# Patient Record
Sex: MALE | Race: ASIAN | NOT HISPANIC OR LATINO | URBAN - METROPOLITAN AREA
[De-identification: names, ages, dates, MRNs, and addresses within clinical notes are randomized per-mention and may not be internally consistent; named-entity substitution may affect disease eponyms.]

---

## 2019-11-27 VITALS
WEIGHT: 162.26 LBS | HEIGHT: 63 IN | TEMPERATURE: 98 F | SYSTOLIC BLOOD PRESSURE: 116 MMHG | DIASTOLIC BLOOD PRESSURE: 75 MMHG | OXYGEN SATURATION: 98 % | HEART RATE: 66 BPM | RESPIRATION RATE: 18 BRPM

## 2019-11-27 RX ORDER — CHLORHEXIDINE GLUCONATE 213 G/1000ML
1 SOLUTION TOPICAL ONCE
Refills: 0 | Status: DISCONTINUED | OUTPATIENT
Start: 2019-12-03 | End: 2019-12-03

## 2019-11-27 NOTE — H&P ADULT - NSICDXPASTMEDICALHX_GEN_ALL_CORE_FT
PAST MEDICAL HISTORY:  Coronary artery disease     Hyperlipidemia     Hypertension     Type II diabetes mellitus

## 2019-11-27 NOTE — H&P ADULT - NSHPLABSRESULTS_GEN_ALL_CORE
EKG: EKG: SR @ 67bpm, T wave flattening in aVL, minimal STD in V3-V6                          11.2   6.30  )-----------( 300      ( 03 Dec 2019 06:59 )             36.3   PT/INR - ( 03 Dec 2019 06:59 )   PT: 11.6 sec;   INR: 1.03          PTT - ( 03 Dec 2019 06:59 )  PTT:33.4 sec    12-03    142  |  106  |  12  ----------------------------<  117<H>  4.4   |  24  |  0.80    Ca    9.3      03 Dec 2019 06:59    TPro  7.6  /  Alb  4.4  /  TBili  0.5  /  DBili  x   /  AST  23  /  ALT  29  /  AlkPhos  95  12-03

## 2019-11-27 NOTE — H&P ADULT - RS GEN PE MLT RESP DETAILS PC
no rhonchi/no chest wall tenderness/no rales/no wheezes/respirations non-labored/no intercostal retractions/clear to auscultation bilaterally

## 2019-11-27 NOTE — H&P ADULT - HISTORY OF PRESENT ILLNESS
confirm Medications    History obtained from   63 y/o alecia speaking male with PMHx of HTN, hyperlipidemia, DM II, Hypothyroidism s/p thyroidectomy, CAD (NSTEMI 2016 at [facility])  presented to his cardiologist for cp described as "heaviness" for the last 2-3 months. The patient has cp with walking 1-2 city blocks, the patient admits to waking up at night with chest pain and when eating, it is associated with shoulder pain.  Denies palpitations, syncope, orthopnea, PND, N/V, and LE edema.    WAITING FOR CATH REPORT FROM [facility]     exercise stress test was done on 10/28/2019 showing small reversible myocardial perfusion defect of the septal wall with mild ischemia.   In light of patients    Stress EKG: horizontal depression confirm Medications    History obtained from   65 y/o alecia speaking male with PMHx of HTN, hyperlipidemia, DM II, Hypothyroidism s/p thyroidectomy, CAD (NSTEMI 2016 at [facility])  presented to his cardiologist for cp described as "heaviness" for the last 2-3 months. The patient has cp with walking 1-2 city blocks, the patient admits to waking up at night with chest pain and when eating, it is associated with shoulder pain.  Denies palpitations, syncope, orthopnea, PND, N/V, and LE edema.    WAITING FOR CATH REPORT FROM [facility]- check tracker    Exercise stress test was done on 10/28/2019 was consistent with myocardial ischemia  NST on 10/28/19 showing small reversible myocardial perfusion defect of the septal wall with mild ischemia.     In light of patients risk factors, CCS class III anginal symptoms, abnormal NST and known history of CAD the patient presents today for recommended cardiac cath with possible intervention. confirm Medications    History obtained from daughter Natalio Washington  63 y/o alecia speaking male with PMHx of HTN, hyperlipidemia, DM II, Hypothyroidism s/p thyroidectomy, CAD (NSTEMI 2016 at [facility])  presented to his cardiologist for cp described as "heaviness" for the last 2-3 months. The patient has cp with walking 1-2 city blocks, the patient admits to waking up at night with chest pain and when eating, it is associated with shoulder pain.  Denies palpitations, syncope, orthopnea, PND, N/V, and LE edema.    WAITING FOR CATH REPORT FROM [facility]- check tracker    Exercise stress test was done on 10/28/2019 was consistent with myocardial ischemia  NST on 10/28/19 showing small reversible myocardial perfusion defect of the septal wall with mild ischemia.     In light of patients risk factors, CCS class III anginal symptoms, abnormal NST and known history of CAD the patient presents today for recommended cardiac cath with possible intervention. confirm Medications    History obtained from daughter Natalio Washington  63 y/o alecia speaking male with PMHx of HTN, hyperlipidemia, DM II, Hypothyroidism s/p thyroidectomy, CAD (NSTEMI 2016 at [facility] DEX to proxLCx and balloon angioplasty to OM1)  presented to his cardiologist for cp described as "heaviness" for the last 2-3 months. The patient has cp with walking 1-2 city blocks, the patient admits to waking up at night with chest pain and when eating, it is associated with shoulder pain.  Denies palpitations, syncope, orthopnea, PND, N/V, and LE edema.    Cardiac Cath at [facility] on 11/1/16 DEX to proxLCx, balloon angioplasty to OM1.   LM: normal  LAD: mild atherosclerosis  LCx: prox 90% stenosis at origin of OM1  OM1: 50% stenosis  RCA: minor luminal irregularities     Exercise stress test was done on 10/28/2019 was consistent with myocardial ischemia  NST on 10/28/19 showing small reversible myocardial perfusion defect of the septal wall with mild ischemia.     In light of patients risk factors, CCS class III anginal symptoms, abnormal NST and known history of CAD the patient presents today for recommended cardiac cath with possible intervention. History obtained from daughter Natalio Washington  65 y/o Hitesh speaking male with PMHx of HTN, hyperlipidemia, DM II, Hypothyroidism s/p thyroidectomy, CAD (NSTEMI 2016 at [facility] DEX to proxLCx and balloon angioplasty to OM1)  presented to his cardiologist for cp described as "heaviness" for the last 2-3 months. The patient has cp with walking 1-2 city blocks, the patient admits to waking up at night with chest pain and when eating, it is associated with shoulder pain.  Denies palpitations, syncope, orthopnea, PND, N/V, and LE edema.    Cardiac Cath at [facility] on 11/1/16 DEX to proxLCx, balloon angioplasty to OM1.   LM: normal  LAD: mild atherosclerosis  LCx: prox 90% stenosis at origin of OM1  OM1: 50% stenosis  RCA: minor luminal irregularities     Exercise stress test was done on 10/28/2019 was consistent with myocardial ischemia  NST on 10/28/19 showing small reversible myocardial perfusion defect of the septal wall with mild ischemia.     In light of patients risk factors, CCS class III anginal symptoms, abnormal NST and known history of CAD the patient presents today for recommended cardiac cath with possible intervention. History obtained from daughter Natalio Washington  65 y/o Hitesh speaking male with PMHx of HTN, hyperlipidemia, DM II, Hypothyroidism s/p thyroidectomy, CAD (NSTEMI 2016 at [facility] DEX to proxLCx and balloon angioplasty to OM1)  presented to his cardiologist for cp described as "heaviness" for the last 2-3 months. The patient has cp with walking 1-2 city blocks, the patient admits to waking up at night with chest pain and when eating, it is associated with shoulder pain.  Denies palpitations, syncope, orthopnea, PND, N/V, and LE edema.    Cardiac Cath at [facility] on 11/1/16 DEX to proxLCx, balloon angioplasty to OM1.   LM: normal  LAD: mild atherosclerosis  LCx: prox 90% stenosis at origin of OM1  OM1: 50% stenosis  RCA: minor luminal irregularities, EF 50%     Exercise stress test was done on 10/28/2019 was consistent with myocardial ischemia  NST on 10/28/19 showing small reversible myocardial perfusion defect of the septal wall with mild ischemia.     In light of patients risk factors, CCS class III anginal symptoms, abnormal NST and known history of CAD the patient presents today for recommended cardiac cath with possible intervention.

## 2019-11-27 NOTE — H&P ADULT - ASSESSMENT
65 y/o Hitesh speaking male with PMHx of HTN, hyperlipidemia, DM II, Hypothyroidism s/p thyroidectomy, CAD (NSTEMI 2016 at [facility] DEX to proxLCx and balloon angioplasty to OM1) who in light of patients risk factors, CCS class III anginal symptoms, abnormal NST and known history of CAD, now presents to [facility] for recommended cardiac cath with possible intervention if clinically indicated.    ASA: III       Mallampati: III      IVF NS @    Precath consent obtained   Risks & benefits of procedure and alternative therapy have been explained to the patient including but not limited to: allergic reaction, bleeding w/possible need for blood transfusion, infection, renal and vascular compromise, limb damage, arrhythmia, stroke, vessel dissection/perforation, Myocardial infarction, emergent CABG. Informed consent obtained and in chart. 63 y/o Hitesh speaking male with PMHx of HTN, hyperlipidemia, DM II, Hypothyroidism s/p thyroidectomy, CAD (NSTEMI 2016 at [facility] DEX to proxLCx and balloon angioplasty to OM1) who in light of patients risk factors, CCS class III anginal symptoms, abnormal NST and known history of CAD, now presents to [facility] for recommended cardiac cath with possible intervention if clinically indicated.    ASA: III       Mallampati: III    Pt reports strict daily compliance with ASA/Plavix regimen. Pt did not take ASA/Plavix yet this AM. Pt given ASA 81mg PO x 1 and Plavix 600mg PO x 1 prior to cath today. H/H stable as reviewed with Dr. Peña.    IVF NS @ 75cc/hr (EF 50% by cath in 11/2016)    Precath consent obtained   Risks & benefits of procedure and alternative therapy have been explained to the patient including but not limited to: allergic reaction, bleeding w/possible need for blood transfusion, infection, renal and vascular compromise, limb damage, arrhythmia, stroke, vessel dissection/perforation, Myocardial infarction, emergent CABG. Informed consent obtained and in chart.

## 2019-12-03 ENCOUNTER — OUTPATIENT (OUTPATIENT)
Dept: OUTPATIENT SERVICES | Facility: HOSPITAL | Age: 64
LOS: 1 days | Discharge: NURSING, REHAB OR LONG-TERM CARE | End: 2019-12-03
Payer: MEDICAID

## 2019-12-03 DIAGNOSIS — Z98.890 OTHER SPECIFIED POSTPROCEDURAL STATES: Chronic | ICD-10-CM

## 2019-12-03 DIAGNOSIS — Z98.49 CATARACT EXTRACTION STATUS, UNSPECIFIED EYE: Chronic | ICD-10-CM

## 2019-12-03 LAB
ALBUMIN SERPL ELPH-MCNC: 4.4 G/DL — SIGNIFICANT CHANGE UP (ref 3.3–5)
ALP SERPL-CCNC: 95 U/L — SIGNIFICANT CHANGE UP (ref 40–120)
ALT FLD-CCNC: 29 U/L — SIGNIFICANT CHANGE UP (ref 10–45)
ANION GAP SERPL CALC-SCNC: 12 MMOL/L — SIGNIFICANT CHANGE UP (ref 5–17)
APTT BLD: 33.4 SEC — SIGNIFICANT CHANGE UP (ref 27.5–36.3)
AST SERPL-CCNC: 23 U/L — SIGNIFICANT CHANGE UP (ref 10–40)
BASOPHILS # BLD AUTO: 0.05 K/UL — SIGNIFICANT CHANGE UP (ref 0–0.2)
BASOPHILS NFR BLD AUTO: 0.8 % — SIGNIFICANT CHANGE UP (ref 0–2)
BILIRUB SERPL-MCNC: 0.5 MG/DL — SIGNIFICANT CHANGE UP (ref 0.2–1.2)
BUN SERPL-MCNC: 12 MG/DL — SIGNIFICANT CHANGE UP (ref 7–23)
CALCIUM SERPL-MCNC: 9.3 MG/DL — SIGNIFICANT CHANGE UP (ref 8.4–10.5)
CHLORIDE SERPL-SCNC: 106 MMOL/L — SIGNIFICANT CHANGE UP (ref 96–108)
CHOLEST SERPL-MCNC: 84 MG/DL — SIGNIFICANT CHANGE UP (ref 10–199)
CK MB CFR SERPL CALC: 1.8 NG/ML — SIGNIFICANT CHANGE UP (ref 0–6.7)
CK SERPL-CCNC: 155 U/L — SIGNIFICANT CHANGE UP (ref 30–200)
CO2 SERPL-SCNC: 24 MMOL/L — SIGNIFICANT CHANGE UP (ref 22–31)
CREAT SERPL-MCNC: 0.8 MG/DL — SIGNIFICANT CHANGE UP (ref 0.5–1.3)
CRP SERPL-MCNC: 0.36 MG/DL — SIGNIFICANT CHANGE UP (ref 0–0.4)
EOSINOPHIL # BLD AUTO: 0.44 K/UL — SIGNIFICANT CHANGE UP (ref 0–0.5)
EOSINOPHIL NFR BLD AUTO: 7 % — HIGH (ref 0–6)
GLUCOSE BLDC GLUCOMTR-MCNC: 104 MG/DL — HIGH (ref 70–99)
GLUCOSE SERPL-MCNC: 117 MG/DL — HIGH (ref 70–99)
HBA1C BLD-MCNC: 6.8 % — HIGH (ref 4–5.6)
HCT VFR BLD CALC: 36.3 % — LOW (ref 39–50)
HDLC SERPL-MCNC: 34 MG/DL — LOW
HGB BLD-MCNC: 11.2 G/DL — LOW (ref 13–17)
IMM GRANULOCYTES NFR BLD AUTO: 0.2 % — SIGNIFICANT CHANGE UP (ref 0–1.5)
INR BLD: 1.03 — SIGNIFICANT CHANGE UP (ref 0.88–1.16)
LIPID PNL WITH DIRECT LDL SERPL: 27 MG/DL — SIGNIFICANT CHANGE UP
LYMPHOCYTES # BLD AUTO: 2.25 K/UL — SIGNIFICANT CHANGE UP (ref 1–3.3)
LYMPHOCYTES # BLD AUTO: 35.7 % — SIGNIFICANT CHANGE UP (ref 13–44)
MCHC RBC-ENTMCNC: 28.1 PG — SIGNIFICANT CHANGE UP (ref 27–34)
MCHC RBC-ENTMCNC: 30.9 GM/DL — LOW (ref 32–36)
MCV RBC AUTO: 91 FL — SIGNIFICANT CHANGE UP (ref 80–100)
MONOCYTES # BLD AUTO: 0.89 K/UL — SIGNIFICANT CHANGE UP (ref 0–0.9)
MONOCYTES NFR BLD AUTO: 14.1 % — HIGH (ref 2–14)
NEUTROPHILS # BLD AUTO: 2.66 K/UL — SIGNIFICANT CHANGE UP (ref 1.8–7.4)
NEUTROPHILS NFR BLD AUTO: 42.2 % — LOW (ref 43–77)
NRBC # BLD: 0 /100 WBCS — SIGNIFICANT CHANGE UP (ref 0–0)
PLATELET # BLD AUTO: 300 K/UL — SIGNIFICANT CHANGE UP (ref 150–400)
POTASSIUM SERPL-MCNC: 4.4 MMOL/L — SIGNIFICANT CHANGE UP (ref 3.5–5.3)
POTASSIUM SERPL-SCNC: 4.4 MMOL/L — SIGNIFICANT CHANGE UP (ref 3.5–5.3)
PROT SERPL-MCNC: 7.6 G/DL — SIGNIFICANT CHANGE UP (ref 6–8.3)
PROTHROM AB SERPL-ACNC: 11.6 SEC — SIGNIFICANT CHANGE UP (ref 10–12.9)
RBC # BLD: 3.99 M/UL — LOW (ref 4.2–5.8)
RBC # FLD: 13.9 % — SIGNIFICANT CHANGE UP (ref 10.3–14.5)
SODIUM SERPL-SCNC: 142 MMOL/L — SIGNIFICANT CHANGE UP (ref 135–145)
TOTAL CHOLESTEROL/HDL RATIO MEASUREMENT: 2.5 RATIO — LOW (ref 3.4–9.6)
TRIGL SERPL-MCNC: 115 MG/DL — SIGNIFICANT CHANGE UP (ref 10–149)
WBC # BLD: 6.3 K/UL — SIGNIFICANT CHANGE UP (ref 3.8–10.5)
WBC # FLD AUTO: 6.3 K/UL — SIGNIFICANT CHANGE UP (ref 3.8–10.5)

## 2019-12-03 PROCEDURE — 85610 PROTHROMBIN TIME: CPT

## 2019-12-03 PROCEDURE — 85025 COMPLETE CBC W/AUTO DIFF WBC: CPT

## 2019-12-03 PROCEDURE — 93005 ELECTROCARDIOGRAM TRACING: CPT

## 2019-12-03 PROCEDURE — 86140 C-REACTIVE PROTEIN: CPT

## 2019-12-03 PROCEDURE — C1894: CPT

## 2019-12-03 PROCEDURE — 93010 ELECTROCARDIOGRAM REPORT: CPT

## 2019-12-03 PROCEDURE — 82550 ASSAY OF CK (CPK): CPT

## 2019-12-03 PROCEDURE — 82553 CREATINE MB FRACTION: CPT

## 2019-12-03 PROCEDURE — 93458 L HRT ARTERY/VENTRICLE ANGIO: CPT

## 2019-12-03 PROCEDURE — C1769: CPT

## 2019-12-03 PROCEDURE — 93458 L HRT ARTERY/VENTRICLE ANGIO: CPT | Mod: 26

## 2019-12-03 PROCEDURE — C1887: CPT

## 2019-12-03 PROCEDURE — 83036 HEMOGLOBIN GLYCOSYLATED A1C: CPT

## 2019-12-03 PROCEDURE — 82962 GLUCOSE BLOOD TEST: CPT

## 2019-12-03 PROCEDURE — 80053 COMPREHEN METABOLIC PANEL: CPT

## 2019-12-03 PROCEDURE — 80061 LIPID PANEL: CPT

## 2019-12-03 PROCEDURE — 85730 THROMBOPLASTIN TIME PARTIAL: CPT

## 2019-12-03 RX ORDER — METOPROLOL TARTRATE 50 MG
1 TABLET ORAL
Qty: 0 | Refills: 0 | DISCHARGE

## 2019-12-03 RX ORDER — CHOLECALCIFEROL (VITAMIN D3) 125 MCG
1 CAPSULE ORAL
Qty: 0 | Refills: 0 | DISCHARGE

## 2019-12-03 RX ORDER — ASCORBIC ACID 60 MG
1 TABLET,CHEWABLE ORAL
Qty: 0 | Refills: 0 | DISCHARGE

## 2019-12-03 RX ORDER — ALLOPURINOL 300 MG
1 TABLET ORAL
Qty: 0 | Refills: 0 | DISCHARGE

## 2019-12-03 RX ORDER — LEVOTHYROXINE SODIUM 125 MCG
1 TABLET ORAL
Qty: 0 | Refills: 0 | DISCHARGE

## 2019-12-03 RX ORDER — ASPIRIN/CALCIUM CARB/MAGNESIUM 324 MG
81 TABLET ORAL ONCE
Refills: 0 | Status: COMPLETED | OUTPATIENT
Start: 2019-12-03 | End: 2019-12-03

## 2019-12-03 RX ORDER — CLOPIDOGREL BISULFATE 75 MG/1
1 TABLET, FILM COATED ORAL
Qty: 0 | Refills: 0 | DISCHARGE

## 2019-12-03 RX ORDER — SIMETHICONE 80 MG/1
1 TABLET, CHEWABLE ORAL
Qty: 0 | Refills: 0 | DISCHARGE

## 2019-12-03 RX ORDER — ASPIRIN/CALCIUM CARB/MAGNESIUM 324 MG
1 TABLET ORAL
Qty: 0 | Refills: 0 | DISCHARGE

## 2019-12-03 RX ORDER — SODIUM CHLORIDE 9 MG/ML
500 INJECTION INTRAMUSCULAR; INTRAVENOUS; SUBCUTANEOUS
Refills: 0 | Status: DISCONTINUED | OUTPATIENT
Start: 2019-12-03 | End: 2019-12-03

## 2019-12-03 RX ORDER — FAMOTIDINE 10 MG/ML
1 INJECTION INTRAVENOUS
Qty: 0 | Refills: 0 | DISCHARGE

## 2019-12-03 RX ORDER — METFORMIN HYDROCHLORIDE 850 MG/1
1 TABLET ORAL
Qty: 0 | Refills: 0 | DISCHARGE

## 2019-12-03 RX ORDER — CLOPIDOGREL BISULFATE 75 MG/1
75 TABLET, FILM COATED ORAL ONCE
Refills: 0 | Status: COMPLETED | OUTPATIENT
Start: 2019-12-03 | End: 2019-12-03

## 2019-12-03 RX ORDER — MELOXICAM 15 MG/1
1 TABLET ORAL
Qty: 0 | Refills: 0 | DISCHARGE

## 2019-12-03 RX ORDER — LOSARTAN POTASSIUM 100 MG/1
1 TABLET, FILM COATED ORAL
Qty: 0 | Refills: 0 | DISCHARGE

## 2019-12-03 RX ADMIN — Medication 81 MILLIGRAM(S): at 07:54

## 2019-12-03 RX ADMIN — SODIUM CHLORIDE 75 MILLILITER(S): 9 INJECTION INTRAMUSCULAR; INTRAVENOUS; SUBCUTANEOUS at 07:55

## 2019-12-03 RX ADMIN — CLOPIDOGREL BISULFATE 75 MILLIGRAM(S): 75 TABLET, FILM COATED ORAL at 07:55

## 2019-12-03 RX ADMIN — SODIUM CHLORIDE 75 MILLILITER(S): 9 INJECTION INTRAMUSCULAR; INTRAVENOUS; SUBCUTANEOUS at 10:10

## 2019-12-03 NOTE — PROGRESS NOTE ADULT - SUBJECTIVE AND OBJECTIVE BOX
Interventional Cardiology PA SDA Discharge Note    Patient without complaints. Ambulated and voided without difficulties    Afebrile, VSS    Ext:    Right Radial :  no  hematoma,  no   bleeding, dressing; C/D/I      Pulses:    intact RAD to baseline     A/P:      65 y/o Hitesh speaking male with PMHx of HTN, hyperlipidemia, DM II, Hypothyroidism s/p thyroidectomy, CAD (NSTEMI 2016 at [facility] ABRIL to proxLCx and balloon angioplasty to OM1) who in light of patients risk factors, CCS class III anginal symptoms, abnormal NST and known history of CAD, now presents to [facility] for recommended cardiac cath. Pt is now s/p cardiac catheterization 12/3/2019: Patent stent in LCx. EF normal. Pt should continue with medical management.             1.	Stable for discharge as per attending Dr. Peña after bed rest, pt voids, wrist stable and 30 minutes of ambulation.  2.	Follow-up with PMD/Cardiologist Dr. Peña in 1-2 weeks  3.	Discharged forms signed and copies in chart Interventional Cardiology PA SDA Discharge Note    Patient without complaints. Ambulated and voided without difficulties    Afebrile, VSS    Ext:    Right Radial :  no  hematoma,  no   bleeding, dressing; C/D/I      Pulses:    intact RAD to baseline     A/P:      63 y/o Hitesh speaking male with PMHx of HTN, hyperlipidemia, DM II, Hypothyroidism s/p thyroidectomy, CAD (NSTEMI 2016 at [facility] ABRIL to proxLCx and balloon angioplasty to OM1) who in light of patients risk factors, CCS class III anginal symptoms, abnormal NST and known history of CAD, now presents to [facility] for recommended cardiac cath. Pt is now s/p cardiac catheterization 12/3/2019: Patent stent in LCx. EF normal. Pt should continue with medical management.             1.	Stable for discharge as per attending Dr. Peña after bed rest, pt voids, wrist stable and 30 minutes of ambulation.  2.          Discontinued Plavix per Dr. Peña. Informed pt to hold Metformin for 48h.   3.	Follow-up with PMD/Cardiologist Dr. Peña in 1-2 weeks  4.	Discharged forms signed and copies in chart Interventional Cardiology PA SDA Discharge Note    Patient without complaints. Ambulated and voided without difficulties    Afebrile, VSS    Ext:    Right Radial :  no  hematoma,  no   bleeding, dressing; C/D/I      Pulses:    intact RAD to baseline     A/P:      [employer]  utilized for patient visit. ID: [id number]    65 y/o Hitesh speaking male with PMHx of HTN, hyperlipidemia, DM II, Hypothyroidism s/p thyroidectomy, CAD (NSTEMI 2016 at [facility] ABRIL to proxLCx and balloon angioplasty to OM1) who in light of patients risk factors, CCS class III anginal symptoms, abnormal NST and known history of CAD, now presents to [facility] for recommended cardiac cath. Pt is now s/p cardiac catheterization 12/3/2019: Patent stent in LCx. EF normal. Pt should continue with medical management.             1.	Stable for discharge as per attending Dr. Peña after bed rest, pt voids, wrist stable and 30 minutes of ambulation.  2.          Discontinued Plavix per Dr. Peña. Informed pt to hold Metformin for 48h.   3.	Follow-up with PMD/Cardiologist Dr. Peña in 1-2 weeks  4.	Discharged forms signed and copies in chart

## 2021-05-14 ENCOUNTER — EMERGENCY (EMERGENCY)
Facility: HOSPITAL | Age: 66
LOS: 1 days | Discharge: HOME | End: 2021-05-14
Attending: EMERGENCY MEDICINE | Admitting: EMERGENCY MEDICINE
Payer: MEDICARE

## 2021-05-14 VITALS
HEIGHT: 63 IN | SYSTOLIC BLOOD PRESSURE: 111 MMHG | DIASTOLIC BLOOD PRESSURE: 77 MMHG | RESPIRATION RATE: 16 BRPM | TEMPERATURE: 98 F | OXYGEN SATURATION: 99 % | HEART RATE: 69 BPM

## 2021-05-14 VITALS
DIASTOLIC BLOOD PRESSURE: 67 MMHG | HEART RATE: 54 BPM | SYSTOLIC BLOOD PRESSURE: 113 MMHG | RESPIRATION RATE: 16 BRPM | OXYGEN SATURATION: 99 %

## 2021-05-14 DIAGNOSIS — Z98.49 CATARACT EXTRACTION STATUS, UNSPECIFIED EYE: Chronic | ICD-10-CM

## 2021-05-14 DIAGNOSIS — Z98.890 OTHER SPECIFIED POSTPROCEDURAL STATES: Chronic | ICD-10-CM

## 2021-05-14 PROBLEM — E78.5 HYPERLIPIDEMIA, UNSPECIFIED: Chronic | Status: ACTIVE | Noted: 2019-11-27

## 2021-05-14 PROBLEM — E11.9 TYPE 2 DIABETES MELLITUS WITHOUT COMPLICATIONS: Chronic | Status: ACTIVE | Noted: 2019-11-27

## 2021-05-14 PROBLEM — I10 ESSENTIAL (PRIMARY) HYPERTENSION: Chronic | Status: ACTIVE | Noted: 2019-11-27

## 2021-05-14 PROBLEM — I25.10 ATHEROSCLEROTIC HEART DISEASE OF NATIVE CORONARY ARTERY WITHOUT ANGINA PECTORIS: Chronic | Status: ACTIVE | Noted: 2019-11-27

## 2021-05-14 LAB
ALBUMIN SERPL ELPH-MCNC: 4.2 G/DL — SIGNIFICANT CHANGE UP (ref 3.3–5)
ALP SERPL-CCNC: 91 U/L — SIGNIFICANT CHANGE UP (ref 40–120)
ALT FLD-CCNC: 83 U/L — HIGH (ref 4–41)
ANION GAP SERPL CALC-SCNC: 12 MMOL/L — SIGNIFICANT CHANGE UP (ref 7–14)
APTT BLD: 34.2 SEC — SIGNIFICANT CHANGE UP (ref 27–36.3)
AST SERPL-CCNC: 41 U/L — HIGH (ref 4–40)
BASOPHILS # BLD AUTO: 0.06 K/UL — SIGNIFICANT CHANGE UP (ref 0–0.2)
BASOPHILS NFR BLD AUTO: 1.1 % — SIGNIFICANT CHANGE UP (ref 0–2)
BILIRUB SERPL-MCNC: 0.9 MG/DL — SIGNIFICANT CHANGE UP (ref 0.2–1.2)
BLD GP AB SCN SERPL QL: NEGATIVE — SIGNIFICANT CHANGE UP
BUN SERPL-MCNC: 11 MG/DL — SIGNIFICANT CHANGE UP (ref 7–23)
CALCIUM SERPL-MCNC: 9.3 MG/DL — SIGNIFICANT CHANGE UP (ref 8.4–10.5)
CHLORIDE SERPL-SCNC: 106 MMOL/L — SIGNIFICANT CHANGE UP (ref 98–107)
CO2 SERPL-SCNC: 25 MMOL/L — SIGNIFICANT CHANGE UP (ref 22–31)
CREAT SERPL-MCNC: 0.91 MG/DL — SIGNIFICANT CHANGE UP (ref 0.5–1.3)
EOSINOPHIL # BLD AUTO: 0.24 K/UL — SIGNIFICANT CHANGE UP (ref 0–0.5)
EOSINOPHIL NFR BLD AUTO: 4.3 % — SIGNIFICANT CHANGE UP (ref 0–6)
GLUCOSE SERPL-MCNC: 102 MG/DL — HIGH (ref 70–99)
HCT VFR BLD CALC: 41.9 % — SIGNIFICANT CHANGE UP (ref 39–50)
HGB BLD-MCNC: 13.5 G/DL — SIGNIFICANT CHANGE UP (ref 13–17)
IANC: 2.09 K/UL — SIGNIFICANT CHANGE UP (ref 1.5–8.5)
IMM GRANULOCYTES NFR BLD AUTO: 0.2 % — SIGNIFICANT CHANGE UP (ref 0–1.5)
INR BLD: 1.09 RATIO — SIGNIFICANT CHANGE UP (ref 0.88–1.16)
LACTATE BLDV-MCNC: 1.7 MMOL/L — SIGNIFICANT CHANGE UP (ref 0.5–2)
LIDOCAIN IGE QN: 51 U/L — SIGNIFICANT CHANGE UP (ref 7–60)
LYMPHOCYTES # BLD AUTO: 2.64 K/UL — SIGNIFICANT CHANGE UP (ref 1–3.3)
LYMPHOCYTES # BLD AUTO: 47.3 % — HIGH (ref 13–44)
MCHC RBC-ENTMCNC: 30.3 PG — SIGNIFICANT CHANGE UP (ref 27–34)
MCHC RBC-ENTMCNC: 32.2 GM/DL — SIGNIFICANT CHANGE UP (ref 32–36)
MCV RBC AUTO: 94.2 FL — SIGNIFICANT CHANGE UP (ref 80–100)
MONOCYTES # BLD AUTO: 0.54 K/UL — SIGNIFICANT CHANGE UP (ref 0–0.9)
MONOCYTES NFR BLD AUTO: 9.7 % — SIGNIFICANT CHANGE UP (ref 2–14)
NEUTROPHILS # BLD AUTO: 2.09 K/UL — SIGNIFICANT CHANGE UP (ref 1.8–7.4)
NEUTROPHILS NFR BLD AUTO: 37.4 % — LOW (ref 43–77)
NRBC # BLD: 0 /100 WBCS — SIGNIFICANT CHANGE UP
NRBC # FLD: 0 K/UL — SIGNIFICANT CHANGE UP
OB PNL STL: POSITIVE
PLATELET # BLD AUTO: 242 K/UL — SIGNIFICANT CHANGE UP (ref 150–400)
POTASSIUM SERPL-MCNC: 4.2 MMOL/L — SIGNIFICANT CHANGE UP (ref 3.5–5.3)
POTASSIUM SERPL-SCNC: 4.2 MMOL/L — SIGNIFICANT CHANGE UP (ref 3.5–5.3)
PROT SERPL-MCNC: 6.9 G/DL — SIGNIFICANT CHANGE UP (ref 6–8.3)
PROTHROM AB SERPL-ACNC: 12.5 SEC — SIGNIFICANT CHANGE UP (ref 10.6–13.6)
RBC # BLD: 4.45 M/UL — SIGNIFICANT CHANGE UP (ref 4.2–5.8)
RBC # FLD: 13.3 % — SIGNIFICANT CHANGE UP (ref 10.3–14.5)
RH IG SCN BLD-IMP: POSITIVE — SIGNIFICANT CHANGE UP
SARS-COV-2 RNA SPEC QL NAA+PROBE: SIGNIFICANT CHANGE UP
SODIUM SERPL-SCNC: 143 MMOL/L — SIGNIFICANT CHANGE UP (ref 135–145)
WBC # BLD: 5.58 K/UL — SIGNIFICANT CHANGE UP (ref 3.8–10.5)
WBC # FLD AUTO: 5.58 K/UL — SIGNIFICANT CHANGE UP (ref 3.8–10.5)

## 2021-05-14 PROCEDURE — 99284 EMERGENCY DEPT VISIT MOD MDM: CPT | Mod: CS,GC

## 2021-05-14 PROCEDURE — 71260 CT THORAX DX C+: CPT | Mod: 26

## 2021-05-14 PROCEDURE — 70491 CT SOFT TISSUE NECK W/DYE: CPT | Mod: 26,77

## 2021-05-14 PROCEDURE — 74177 CT ABD & PELVIS W/CONTRAST: CPT | Mod: 26

## 2021-05-14 PROCEDURE — 70491 CT SOFT TISSUE NECK W/DYE: CPT | Mod: 26

## 2021-05-14 NOTE — ED ADULT TRIAGE NOTE - CHIEF COMPLAINT QUOTE
c/o bloody stools x 1 month. Daughter states pt has been having blood in stool almost every time he has bowel movement for past month. Takes [id number] daily. Hx DM2

## 2021-05-14 NOTE — ED PROVIDER NOTE - PROGRESS NOTE DETAILS
Joseph Frankel PGY2: Work up wnl. Instructed to follow up with GI. Spoke with Ale (daughter) at [phone] and informed of results. Will dc with GI follow up for colonoscopy. Discussed plan and return precautions with patient who understands and agrees. All questions answered.

## 2021-05-14 NOTE — ED PROVIDER NOTE - PHYSICAL EXAMINATION
Gen: Alert and oriented. Lying comfortably in bed. Answering questions appropriately  HEENT: questionable mild neck swelling bl? No LN palpated. extra occular movements intact, no nasal discharge, mucous membranes moist  CV: Regular rate and rhythm, +S1/S2, no murmurs/rubs/gallops, distal pulses intact  Resp: Clear to ausculation bilaterally, no wheezes/rhonchi/rales  GI: Abdomen soft with mild distention, non tender to palpation, no masses  Rectal (chaperoned by RN): Scant bright red blood. No obvious hemorrhage.   MSK: No open wounds, no bruising, no LE edema, Homans sign negative bl  Neuro: A&Ox4, following commands, moving all four extremities spontaneously  Psych: appropriate mood

## 2021-05-14 NOTE — ED PROVIDER NOTE - PATIENT PORTAL LINK FT
You can access the FollowMyHealth Patient Portal offered by [facility] by registering at the following website: [email or web address] By joining [facility]’s FollowMyHealth portal, you will also be able to view your health information using other applications (apps) compatible with our system.

## 2021-05-14 NOTE — ED PROVIDER NOTE - OBJECTIVE STATEMENT
[employer] Used: [place], [id number]    65 yo M with pmh of CAD sp stents on aspirin presenting for bloody stools. Patient states that for the past 2 months he has had painless brbpr when defecating. Associated with SOB. Endorsing that he is having trouble walking 1 flight of steps. No chest pain. Also endorsing abdominal bloating with mild pain as well as bl neck swelling and pain. Is not acutely sob. Has never had colonoscopy.

## 2021-05-14 NOTE — ED PROVIDER NOTE - ATTENDING CONTRIBUTION TO CARE
Dr. Reyes: 65 yo male with CAD with stents, on ASA, in ED with 2 month of BRBPR, as well as SOB and bloating feeling in abdomen.  He also endorses swelling in bilateral neck.  On exam pt chronically-ill appearing but in NAD, heart RRR, lungs CTAB, abd NTND, extremities without swelling, strength 5/5 in all extremities and skin without rash.  Neck normal appearing at site of pt noting neck swelling, including no crepitus, erythema or TTP.

## 2021-05-14 NOTE — ED PROVIDER NOTE - NSFOLLOWUPCLINICS_GEN_ALL_ED_FT
[facility]  Gastroenterology  [address] [place] [address] [address]  [place] [address]  Phone: [phone]  Fax:

## 2021-05-14 NOTE — ED ADULT NURSE NOTE - CHIEF COMPLAINT QUOTE
c/o bloody stools x 1 month. Daughter states pt has been having blood in stool almost every time he has bowel movement for past month. Takes [id number] daily. Hx DM2

## 2021-05-14 NOTE — ED PROVIDER NOTE - CLINICAL SUMMARY MEDICAL DECISION MAKING FREE TEXT BOX
Joseph Frankel PGY2: 67 yo M with BRBPR. VSS. Patient looks well and is non toxic appearing. PE as above. Consider malignancy causing distension and BRBPR. More likely hemorrhoids. Neck with questionable swelling but patient endorses pain there. Per daughter an US of neck was done and was negative, however as patient with continued complaints will scan. Will get labs, imaging, ecg, and reassess.

## 2021-05-14 NOTE — ED PROVIDER NOTE - NSFOLLOWUPINSTRUCTIONS_ED_ALL_ED_FT
You were seen for bloody stools.    The work up was normal.     Please follow up with gastroenterologist for a colonoscopy. Please see attached contact info.    Please bring attached results.    SEEK IMMEDIATE MEDICAL CARE IF YOU HAVE ANY OF THE FOLLOWING SYMPTOMS: extreme weakness/chest pain/shortness of breath, black or bloody stools, vomiting blood, fainting, fever, or any signs of dehydration.

## 2021-05-14 NOTE — ED PROVIDER NOTE - NS ED ROS FT
Gen: Denies fever, chills, generalized weakness  CV: Denies chest pain, palpitations  HEENT: Denies neck pain, headache, vision changes  Skin: Denies rash, erythema, color changes  Resp: + SOB, denies cough  Endo: Denies sensitivity to heat, cold, increased urination  GI: Denies constipation, nausea, vomiting, diarrhea  Msk: Denies back pain, LE swelling, extremity pain  : Denies dysuria, increased frequency  Neuro: Denies LOC, focal weakness, numbness, tingling  Psych: Denies hx of psych, SI, HI